# Patient Record
Sex: MALE | Race: OTHER | Employment: FULL TIME | URBAN - METROPOLITAN AREA
[De-identification: names, ages, dates, MRNs, and addresses within clinical notes are randomized per-mention and may not be internally consistent; named-entity substitution may affect disease eponyms.]

---

## 2020-08-10 ENCOUNTER — HOSPITAL ENCOUNTER (OUTPATIENT)
Dept: PHYSICAL THERAPY | Age: 35
Discharge: HOME | End: 2020-08-10
Payer: COMMERCIAL

## 2020-08-10 DIAGNOSIS — M25.511 BILATERAL SHOULDER PAIN, UNSPECIFIED CHRONICITY: ICD-10-CM

## 2020-08-10 DIAGNOSIS — M25.521 BILATERAL ELBOW JOINT PAIN: ICD-10-CM

## 2020-08-10 DIAGNOSIS — M25.522 BILATERAL ELBOW JOINT PAIN: ICD-10-CM

## 2020-08-10 DIAGNOSIS — M25.512 BILATERAL SHOULDER PAIN, UNSPECIFIED CHRONICITY: ICD-10-CM

## 2020-08-10 DIAGNOSIS — M95.8 WINGED SCAPULA OF BOTH SIDES: ICD-10-CM

## 2020-08-10 DIAGNOSIS — G71.02 MUSCULAR DYSTROPHY, FACIOSCAPULOHUMERAL (HCC): ICD-10-CM

## 2020-08-10 PROCEDURE — 97163 PT EVAL HIGH COMPLEX 45 MIN: CPT

## 2020-08-10 PROCEDURE — 97110 THERAPEUTIC EXERCISES: CPT

## 2020-08-10 NOTE — THERAPY EVALUATION
Fran Alcaraz : 1985 Primary: [place] [facility]* Secondary:  [facility] [address] [place] [address], [address] [place] [address] [phone]   [phone] Visit Count:  1 
  
OUTPATIENT PHYSICAL THERAPY:Initial Assessment 8/10/2020 Treatment Diagnosis: Pain in Left Shoulder (M25.512) Pain in Right Shoulder (M25.511) Pain in left elbow (M25.522) Pain in right elbow (M25.521) Generalized Muscle Weakness (M62.81) Precautions/Allergies: Requires an  Patient has no known allergies. MD Orders: evaluate and treat  MEDICAL/REFERRING DIAGNOSIS: 
Muscular dystrophy, facioscapulohumeral ([facility] [address] [G71.02] Winged scapula of both sides [M95.8] Bilateral elbow joint pain [M25.521, M25.522] Bilateral shoulder pain, unspecified chronicity [M25.511, M25.512] DATE OF ONSET:  with recent decline in left UE strength REFERRING PHYSICIAN: Carmine Bradley MD  
RETURN PHYSICIAN APPOINTMENT: TBD by patient INITIAL ASSESSMENT:  Mr. Michelle Nath was diagnosed with Muscular dystrophy when he was 17 yrs old that has been characterized by gradual weakening in B UE with the right more affected than left. Pt reports he has had chronic pain in B elbow and thoracic region pain since . 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 Pt does understand MD is a progressive disorder and that his prognosis is guarded for significant improvements noted in strength.  pt presents to physical therapy with decreased strength, decreased thoracic ROM, poor muscular endurance, and generalized debility due to complications associated with MD.  Patient will benefit from skilled physical therapy for manual therapeutic techniques (as appropriate), therapeutic exercises and activities, neuromuscular re-education, and comprehensive home exercises program to address current impairments and functional limitations in order to develop a HEP that pt can safely complete at home or in community gym setting. PROBLEM LIST (Impacting functional limitations): 1. Decreased Strength 2. Decreased ADL/Functional Activities 3. Increased Pain 4. Decreased Activity Tolerance 5. Increased Fatigue 6. Decreased Flexibility/Joint Mobility 7. Edema/Girth 8. Decreased Knowledge of Precautions 9. Decreased [place] with Home Exercise Program INTERVENTIONS PLANNED: (Treatment may consist of any combination of the following) 1. Balance Exercise 2. Cold 3. Cryotherapy 4. Family Education 5. Heat 6. Home Exercise Program (HEP) 7. Manual Therapy 8. Neuromuscular Re-education/Strengthening 9. Range of Motion (ROM) 10. Therapeutic Activites 11. Therapeutic Exercise/Strengthening 12. Transcutaneous Electrical Nerve Stimulation (TENS) TREATMENT PLAN: 
Effective Dates: 8/10/2020 TO 10/9/2020 (60 days). Frequency/Duration: 2 times a week for 60 Day(s) GOALS: (Goals have been discussed and agreed upon with patient.) Short-Term Goals: 30 days  Goal Met 1. Augie Barrientos will be independent with HEP to maintain functional capacity and ROM spine and UE to decrease pain. 1.  [] Date: 2. Augie Barrientos will demonstrate ability to complete 10 min of consecutive UE ergometer to improve muscular endurance and cardiovascular capacity. 2.  [] Date: 3. Augie Barrientos will improve  strength by 10 lbs in order to carry, lift, and manipulate objects needed for ADLs and IADLs. 3.  [] Date:  
    
 Long Term Goals: 60 days Goal Met 1. Augie Barrientos will demonstrate a 10 point increase on the DASH to indicate decreased perceived disability and improved participation in ADLs/IADLs. 1.  [] Date: 2. Augie Barrientos will demonstrate ability to carry 20 lbs in left/right UE will walking >= 150ft in order to carry objects in household and community. 2.  [] Date: 3. Augie Barrientos will demonstrate appropriate lifting technique from floor to waist level with 20lbs and no cues from therapist to complete work related duties. 3.  [] Date: 4. Ashkan Sheehan will report no more than 4/10 pain in B elbows with exercise. 4.  [] Date:  
 
 
OUTCOME MEASURE:  
Tool Used: Disabilities of the Arm, Shoulder and Hand (DASH) Questionnaire - Quick Version Score:  Initial: 49/55  Most Recent: X/55 (Date: -- ) Interpretation of Score: The DASH is designed to measure the activities of daily living in person's with upper extremity dysfunction or pain. Each section is scored on a 1-5 scale, 5 representing the greatest disability. The scores of each section are added together for a total score of 55. Tool Used:  Strength Score:  Initial: 65 lbs Right, Left 105 lbs Most Recent: X lbs (Date: -- ) Interpretation of Score: MDC for  strength is 14 lbs Outcome Measure Conversion Site MEDICAL NECESSITY:  
· Patient is expected to demonstrate progress in strength, range of motion, functional technique and postural control/awareness · Skilled intervention continues to be required due to deficits and impairments seen upon initial evaluation affecting patient's participation in ADLs and functional tasks. REASON FOR SERVICES/OTHER COMMENTS: 
· Patient continues to require skilled intervention due to deficits and impairments seen upon initial evaluation affecting patient's participation in ADLs and functional tasks. Total Duration: 40 minutes plus treatment PT Patient Time In/Time Out Time In: 1050 Time Out: 1140 Rehabilitation Potential For Stated Goals: Poor Regarding Edwar Gamezon therapy, I certify that the treatment plan above will be carried out by a therapist or under their direction. Thank you for this referral, Sebastian Slater PT Referring Physician Signature: Radha Valverde MD _______________________________ Date _____________ PAIN/SUBJECTIVE:  
Initial:   10/10 Post Session:  10/10 HISTORY:  
 History of Injury/Illness (Reason for Referral): 
ot reports working at a [employer] where  I have to lift 10 -15 lbs. A lot of pain after my shift of 12 hours my left arm. Minimal help with previous therapy intervention. Novemebr 2018 last therapy session. I want to bend my arm and have less pain on the left. · Aggravating factors: lifting, carrying · Relieving factors: rest, stretching Dominant Side:  
      RIGHT Pain Scale on day of evaluation: · Current: 10/10 · Best: does not rate/10 · Worst: 10/10 Prior Level of Function/Work/Activity: 
Current level of function: limited by functional capacity, and gradual loss of strength Work/hobbies: bicycle at home for short durations and works in [employer] for 12 hour shifts Other Clinical Tests:   
      Imaging: NO  
 
Previous Treatment Approaches: Outpatient therapy in 2018 Social History/Living Environment:  
Pt lives in a one level home with family Past Medical History/Comorbidities:  
Mr. Beatris Parra  has no past medical history on file. Mr. Beatris Parra  has no past surgical history on file. Ambulatory/Rehab Services H2 Model Falls Risk Assessment Risk Factors: 
     (1)  Gender [Male] Ability to Rise from Chair: 
     (0)  Ability to rise in a single movement Falls Prevention Plan: No modifications necessary Total: (5 or greater = High Risk): 1 ©2010 [facility] [address] [place] [address] States Patent #[id number]. Federal Law prohibits the replication, distribution or use without written permission from [facility] [employer] Current Medications: No current outpatient medications on file. None. Date Last Reviewed:  8/10/2020 Number of Personal Factors/Comorbidities that affect the Plan of Care: 3+: HIGH COMPLEXITY EXAMINATION:  
ROM:   
AROM/PROM Joint: Eval Date: 8/10/20  Re-Assess Date:  Re-Assess Date:   
ACTIVE ROM (standing) RIGHT LEFT RIGHT LEFT RIGHT LEFT Shoulder Flexion Shoulder Abduction Shoulder Internal Rotation (Apley's) Shoulder External Rotation (Apley's) Elbow ROM        
PASSIVE ROM (supine) Shoulder Flexion full full Shoulder Abduction full full Shoulder Internal Rotation full full Shoulder External Rotation  Strength 65 lbs 105 lbs Strength:   
Joint: Eval Date: 8/10/20  Re-Assess Date:  Re-Assess Date:   
 RIGHT LEFT RIGHT LEFT RIGHT LEFT Shoulder Flexion 3-/5 3+/5 Shoulder Abduction  (C5) 3-/5 3+/5 Shoulder Internal Rotation NT/5 NT/5 Shoulder External Rotation 2+/5 3/5 Elbow Flexion  (C6) 2/5 3+/5 Elbow Extension (C7) NT/5 NT/5 Wrist Flexion (C7) 4-/5 4-/5 Wrist Extension (C6) 4-/5 4-/5 Scapular Elevation(C2, 3, 4) 4/5 4/5 Scapular Depression 3+/5 3+/5 Scapular retraction 3-/5 3/5 Strength:  0-5 scale, 0 no muscle contraction; 1 no joint motion but contraction felt; 2 -less than full ROM in gravity eliminated; 2 full ROM in grav eliminated; 2+ full ROM in grav eliminated and caroline to withstand minimal resist; 3-less than full ROM against gravity; 3 full ROM against gravity;  3+ full ROM against gravity and able to withstand minimal resist; 4- full ROM against gravity and able to withstand less than mod resist; 4 full ROM against gravity and able to withstand mod resist; 5 full ROM against gravity and able to withstand max resist.  
 
Neurological Screen: 
   
      Sensation: NT Functional Mobility:  
    
      Overhead reach: must complete with left UE help Body Structures Involved: 1. Bones 2. Joints 3. Muscles 4. Ligaments Body Functions Affected: 1. Sensory/Pain 2. Movement Related Activities and Participation Affected: 1. General Tasks and Demands 2. Mobility 3. Self Care 4. Domestic Life 5. Interpersonal Interactions and Relationships 6. Community, Social and [place] Number of elements (examined above) that affect the Plan of Care: 4+: HIGH COMPLEXITY CLINICAL PRESENTATION:  
Presentation: Evolving clinical presentation with unstable and unpredictable characteristics: HIGH COMPLEXITY CLINICAL DECISION MAKING:  
Use of outcome tool(s) and clinical judgement create a POC that gives a: Difficult prediction of patient's progress: HIGH COMPLEXITY

## 2020-08-10 NOTE — PROGRESS NOTES
Mayito Avelar  : 1985  Payor: Nuno Peñaloza / [place]: [place] OF [address] [place] [address] Rd / Product Type: PPO /  [id number] [facility], [address]  [place], [address]  [phone]   [phone]        OUTPATIENT PHYSICAL THERAPY: Daily Treatment Note 8/10/2020 Visit Count:  1  Treatment Diagnosis: Pain in Left Shoulder (M25.512)  Pain in Right Shoulder (M25.511)  Pain in left elbow (M25.522)  Pain in right elbow (M25.521)   Generalized Muscle Weakness (M62.81)    Precautions/Allergies: Requires an   Patient has no known allergies. MD Orders: evaluate and treat  MEDICAL/REFERRING DIAGNOSIS:  Muscular dystrophy, facioscapulohumeral ([facility] [address] [G71.02]  Winged scapula of both sides [M95.8]  Bilateral elbow joint pain [M25.521, M25.522]  Bilateral shoulder pain, unspecified chronicity [M25.511, M25.512]   DATE OF ONSET:  with recent decline in left UE strength  REFERRING PHYSICIAN: Marli Pike MD   RETURN PHYSICIAN APPOINTMENT: TBD by patient           Pre-treatment Symptoms/Complaints:  See Initial Eval Dated 8/10/20 for more details. Pain: Initial:10/10 Post Session: 10/10   Medications Last Reviewed:  8/10/2020     Updated Objective Findings: See Initial Eval for more details. TREATMENT:   THERAPEUTIC EXERCISE: (10 minutes):  Exercises per grid below to improve mobility, strength and balance. Required minimal visual, verbal and manual cues to promote proper body alignment and promote proper body posture. Progressed resistance and complexity of movement as indicated. Date:  8/10/2020 Date:   Date:     Activity/Exercise Parameters Parameters Parameters   Education HEP, POC, PT goals, anatomy/pathology     Scapular retraction with ER 10 x     Open book 10 x      Quad rocking 10 x     Self thoracic mob  Chair  1 min                       THERAPEUTIC ACTIVITY: ( 0 minutes):  Activities per gid below to improve functional movement related mobility, strength and balance to improve neuro-muscular carryover to daily functional activities for improving patient's quality of life. Required visual, verbal and manual cues to promote proper body alignment and promote proper body posture/mechanics. Progressed resistance and complexity of movement as indicated. Date:  8/10/2020 Date:   Date:     Activity/Exercise Parameters Parameters Parameters                                                                               MANUAL THERAPY: (0 minutes): Joint mobilization, Soft tissue mobilization was utilized and necessary because of the patient's restricted joint motion and restricted motion of soft tissue mobility. Date  8/10/2020    Technique Used Grade  Level # Time(s) Effect while being performed                                                                 HEP Log Date 1. Quad rocking, thoracic extension mob, scapular retraction with ER 8/10/2020   2.  8/10/2020   3. 8/10/2020   4.    5.           [employer] Screening Portal  Treatment/Session Summary:    Response to Treatment: Pt demonstrated understanding of POC and initial HEP. No increase in pain or adverse reactions. Communication/Consultation:  POC, HEP, PT goals, Faxed initial evaluation to MD.   Equipment provided today: HEP Handout     Recommendations/Intent for next treatment session:   Next visit will focus on Core Stability Pain Science Education RTC strengthening weightbearing. Treatment Plan of Care Effective Dates: 8/10/2020 TO 10/9/2020 (60 days).   Frequency/Duration: 2 times a week for 60 Days             Total Treatment Billable Duration:   10  Rx plus Eval   PT Patient Time In/Time Out  Time In: 1050  Time Out: [place] Orellana    Future Appointments   Date Time Provider Marichuy Peng   8/13/2020  2:00 PM Gomez Huerta PT [facility]   8/20/2020  2:00 PM Gomez Huerta PT [facility]   8/21/2020  9:00 AM Augusta Sebastián, PT [facility]   8/27/2020  9:00 AM Augusta Sebastián, PT [facility]   8/28/2020  9:00 AM Augusta [place], PT [facility]   9/3/2020  2:00 PM Augusta [place], PT [facility]   9/4/2020  9:00 AM Augusta Sebastián, PT [facility]   9/8/2020 12:40 PM Alexa Lund MD [id number]   9/10/2020  2:00 PM Augusta [place], PT [facility]   9/11/2020  9:00 AM Augusta Sebastián, PT [facility]   9/17/2020  2:00 PM Augusta Sebastián, PT [facility]   9/24/2020  2:00 PM Augusta [place], PT [facility]   9/25/2020  9:00 AM Augusta Sebastián, PT [facility]

## 2020-08-12 NOTE — PROGRESS NOTES
I am accessing Mr. Sanam Inman chart as a part of our department's internal chart auditing process. I certify that Mr. Héctor Sanders is, or was, a patient in our department.   Thank you,  Dino Steen  8/12/2020

## 2020-08-13 ENCOUNTER — APPOINTMENT (OUTPATIENT)
Dept: PHYSICAL THERAPY | Age: 35
End: 2020-08-13
Payer: COMMERCIAL

## 2020-08-20 ENCOUNTER — APPOINTMENT (OUTPATIENT)
Dept: PHYSICAL THERAPY | Age: 35
End: 2020-08-20
Payer: COMMERCIAL

## 2020-08-21 ENCOUNTER — HOSPITAL ENCOUNTER (OUTPATIENT)
Dept: PHYSICAL THERAPY | Age: 35
Discharge: HOME | End: 2020-08-21
Payer: COMMERCIAL

## 2020-08-21 PROCEDURE — 97530 THERAPEUTIC ACTIVITIES: CPT

## 2020-08-21 PROCEDURE — 97110 THERAPEUTIC EXERCISES: CPT

## 2020-08-21 NOTE — PROGRESS NOTES
Elena Freeze  : 1985  Payor: Yoko Earing / [place]: [place] OF [address] [place] [address] Rd / Product Type: PPO /  Nathan Crumbly at [address] [facility], [address], [address] [place] [address]  [phone]   [phone]        OUTPATIENT PHYSICAL THERAPY: Daily Treatment Note 2020 Visit Count:  2  Treatment Diagnosis: Pain in Left Shoulder (M25.512)  Pain in Right Shoulder (M25.511)  Pain in left elbow (M25.522)  Pain in right elbow (M25.521)   Generalized Muscle Weakness (M62.81)    Precautions/Allergies: Requires an   Patient has no known allergies. MD Orders: evaluate and treat  MEDICAL/REFERRING DIAGNOSIS:  Muscular dystrophy, facioscapulohumeral ([facility] [address] [G71.02]  Winged scapula of both sides [M95.8]  Bilateral elbow joint pain [M25.521, M25.522]  Bilateral shoulder pain, unspecified chronicity [M25.511, M25.512]   DATE OF ONSET:  with recent decline in left UE strength  REFERRING PHYSICIAN: Sacha Licea MD   RETURN PHYSICIAN APPOINTMENT: TBD by patient           Pre-treatment Symptoms/Complaints:  Pt with no specific reported change. Interpretor present during entire session. Pain: Initial:0/10 Post Session: 0/10   Medications Last Reviewed:  2020     Updated Objective Findings: None today. TREATMENT:   THERAPEUTIC EXERCISE: (20 minutes):  Exercises per grid below to improve mobility, strength and balance. Required minimal visual, verbal and manual cues to promote proper body alignment and promote proper body posture. Progressed resistance and complexity of movement as indicated.      Date:  8/10/2020 Date:  20 Date:     Activity/Exercise Parameters Parameters Parameters   Education HEP, POC, PT goals, anatomy/pathology     Scapular retraction with ER 10 x     Open book 10 x      Quad rocking 10 x 2 x 10 reps with emphasis on forward rock    Self thoracic mob  Chair  1 min Chair  5 x 10 - 15 sec    UBE  3 min fwd/backward    Quadruped trunk roation  15 x left/right    Wall slides  2 x 10 reps  Partial lift off          THERAPEUTIC ACTIVITY: ( 25 minutes): Activities per gid below to improve functional movement related mobility, strength and balance to improve neuro-muscular carryover to daily functional activities for improving patient's quality of life. Required visual, verbal and manual cues to promote proper body alignment and promote proper body posture/mechanics. Progressed resistance and complexity of movement as indicated. Date:  8/21/2020 Date:   Date:     Activity/Exercise Parameters Parameters Parameters   Rosi Swann Deadlift 15 lb  3 x 10 reps       Lester Carry 15 lbs  4 x 150 ft       Chest Press 5 lb  2 x 10 reps       Overhead Press 5lb  2 x 10 reps       Wall push up 2 x 10 reps                                        HEP Log Date 1. Quad rocking, thoracic extension mob, scapular retraction with ER 8/10/2020   2.  8/21/2020   3. 8/21/2020   4.    5.           [employer] Portal  Treatment/Session Summary:    Response to Treatment: Pt has increased difficulty lifting overhead without trunk compensation due to progressive neurological changes associated with muscular dystrophy. Pt with decreased understanding of un-likeliness of returning bicep function on bilateral sides with goal of therapy to strengthen muscles more globally and work on conditioning overall without overexertion at detrement of preserved functional capabilities. Pt reports increased fatigue by end of session. Communication/Consultation:  None today   Equipment provided today: None today     Recommendations/Intent for next treatment session:   Next visit will focus on Core Stability Pain Science Education RTC strengthening weightbearing. Treatment Plan of Care Effective Dates: 8/21/2020 TO 10/9/2020 (60 days).   Frequency/Duration: 2 times a week for 60 Days             Total Treatment Billable Duration:   45  Rx  PT Patient Time In/Time Out  Time In: 0900  Time Out: 0945  Brittany Stephenser, PT    Future Appointments   Date Time Provider Marichuy Ginette   8/27/2020  9:00 AM [place] Eans, PT [facility] [place] [facility]   8/28/2020  9:00 AM [place] Eans, PT [facility]   9/3/2020  2:00 PM Gu[place] Eans, PT [facility]   9/4/2020  9:00 AM Gu[place] Eans, PT [facility]   9/8/2020 12:40 PM Carmine Bradley MD [id number]   9/10/2020  2:00 PM Gu[place] Eans, PT [facility]   9/11/2020  9:00 AM Gu[place] Eans, PT [facility]   9/17/2020  2:00 PM Gu[place] Eans, PT [facility]   9/24/2020  2:00 PM Gu[place] Eans, PT [facility]   9/25/2020  9:00 AM Gu[place] Eans, PT [facility]

## 2020-08-27 ENCOUNTER — APPOINTMENT (OUTPATIENT)
Dept: PHYSICAL THERAPY | Age: 35
End: 2020-08-27
Payer: COMMERCIAL

## 2020-08-28 ENCOUNTER — HOSPITAL ENCOUNTER (OUTPATIENT)
Dept: PHYSICAL THERAPY | Age: 35
Discharge: HOME | End: 2020-08-28
Payer: COMMERCIAL

## 2020-08-28 PROCEDURE — 97530 THERAPEUTIC ACTIVITIES: CPT

## 2020-08-28 PROCEDURE — 97110 THERAPEUTIC EXERCISES: CPT

## 2020-08-28 NOTE — PROGRESS NOTES
Zayra Marlene  : 1985  Payor: Edgar Baeza / Plan: [place] CROSS OF [address] [place] [address] Rd / Product Type: PPO /  [id number]. [facility], [address], [address]  [phone]   [phone]        OUTPATIENT PHYSICAL THERAPY: Daily Treatment Note 2020 Visit Count:  3  Treatment Diagnosis: Pain in Left Shoulder (M25.512)  Pain in Right Shoulder (M25.511)  Pain in left elbow (M25.522)  Pain in right elbow (M25.521)   Generalized Muscle Weakness (M62.81)    Precautions/Allergies: Requires an   Patient has no known allergies. MD Orders: evaluate and treat  MEDICAL/REFERRING DIAGNOSIS:  Muscular dystrophy, facioscapulohumeral ([facility] [address] [G71.02]  Winged scapula of both sides [M95.8]  Bilateral elbow joint pain [M25.521, M25.522]  Bilateral shoulder pain, unspecified chronicity [M25.511, M25.512]   DATE OF ONSET:  with recent decline in left UE strength  REFERRING PHYSICIAN: Olena Phillips MD   RETURN PHYSICIAN APPOINTMENT: TBD by patient           Pre-treatment Symptoms/Complaints:  Pt with no specific reported change. Interpretor present during entire session. Pain: Initial:0/10 Post Session: 0/10   Medications Last Reviewed:  2020     Updated Objective Findings: None today. TREATMENT:   THERAPEUTIC EXERCISE: (20 minutes):  Exercises per grid below to improve mobility, strength and balance. Required minimal visual, verbal and manual cues to promote proper body alignment and promote proper body posture. Progressed resistance and complexity of movement as indicated.      Date:  8/10/2020 Date:  20 Date:  20   Activity/Exercise Parameters Parameters Parameters   Education HEP, POC, PT goals, anatomy/pathology     Scapular retraction with ER 10 x     Open book 10 x      Quad rocking 10 x 2 x 10 reps with emphasis on forward rock    Self thoracic mob  Chair  1 min Chair  5 x 10 - 15 sec    UBE  3 min fwd/backward 3 min fwd, 3 min backward lvl 1.2   Quadruped trunk roation  15 x left/right    Wall slides  2 x 10 reps  Partial lift off 1:30 min  Partial lift off   Eccentric lat   Supine with dowel vince, 5 lb  3 x 10 reps   Pec Stretch   Corner  3 x 30 sec         THERAPEUTIC ACTIVITY: ( 25 minutes): Activities per gid below to improve functional movement related mobility, strength and balance to improve neuro-muscular carryover to daily functional activities for improving patient's quality of life. Required visual, verbal and manual cues to promote proper body alignment and promote proper body posture/mechanics. Progressed resistance and complexity of movement as indicated. Date:  8/21/2020 Date:  8/28/20 Date:     Activity/Exercise Parameters Parameters Parameters   Kettle Swann Deadlift 15 lb  3 x 10 reps 15lb  3 x 10 reps     Lester Carry 15 lbs  4 x 150 ft       Chest Press 5 lb  2 x 10 reps Supine  3 x 10 reps  5lb     Overhead Press 5lb  2 x 10 reps      Wall push up 2 x 10 reps 3 x 10 reps  36in surface     Goblet Squat   10lb  3 x 10 reps     Goblet Carry   5lb  3 x 150 ft                  HEP Log Date 1. Quad rocking, thoracic extension mob, scapular retraction with ER 8/10/2020   2.  8/28/2020   3. 8/28/2020   4.    5.           [facility]  Treatment/Session Summary:    Response to Treatment: Pt requires frequent rest breaks due to fatigue of muscles. Pt reports that he had no pain or soreness after last session. Therapist continues to educate pt that a significant gain in strength is not anticipated due to nature of muscular dystrophy. Communication/Consultation:  Therapist encourages pt to use NMES machine he got last year to assist with ADLs, however therapist educated pt it will not necessarily make long term gains in overall strength, but could be helpful for activating the muscle.    Equipment provided today: None today     Recommendations/Intent for next treatment session:   Next visit will focus on Core Stability Pain Science Education RTC strengthening weightbearing. Treatment Plan of Care Effective Dates: 8/10/2020 TO 10/9/2020 (60 days).   Frequency/Duration: 2 times a week for 60 Days       Total Treatment Billable Duration:   45  Rx  PT Patient Time In/Time Out  Time In: 0905  Time Out: 2001 Harry Garcia, PT    Future Appointments   Date Time Provider Marichuy Peng   9/3/2020  2:00 PM Gabriela Daniels, PT [facility]   9/4/2020  9:00 AM [facility]   9/8/2020 12:40 PM Dana Berman MD [id number]   9/10/2020  2:00 PM Gabriela Daniels, PT [facility]   9/11/2020  9:00 AM Gabriela Daniels, PT [facility]   9/17/2020  2:00 PM Gabriela Daniels, PT [facility]   9/24/2020  2:00 PM Gabriela Daniels, PT [facility]   9/25/2020  9:00 AM Gabriela Daniels, PT [facility]

## 2020-09-03 ENCOUNTER — APPOINTMENT (OUTPATIENT)
Dept: PHYSICAL THERAPY | Age: 35
End: 2020-09-03
Payer: COMMERCIAL

## 2020-09-04 ENCOUNTER — HOSPITAL ENCOUNTER (OUTPATIENT)
Dept: PHYSICAL THERAPY | Age: 35
Discharge: HOME | End: 2020-09-04
Payer: COMMERCIAL

## 2020-09-04 PROCEDURE — 97110 THERAPEUTIC EXERCISES: CPT

## 2020-09-04 PROCEDURE — 97530 THERAPEUTIC ACTIVITIES: CPT

## 2020-09-04 NOTE — PROGRESS NOTES
Meli Laser  : 1985  Payor: Jessica José / Plan: [place] OF [address] [place] [address] Rd / Product Type: PPO /  [id number] [facility] at [address] [place] [address]. [facility], Suite [facility], [address] [place] [address]  [phone]   [phone]        OUTPATIENT PHYSICAL THERAPY: Daily Treatment Note 2020 Visit Count:  4  Treatment Diagnosis: Pain in Left Shoulder (M25.512)  Pain in Right Shoulder (M25.511)  Pain in left elbow (M25.522)  Pain in right elbow (M25.521)   Generalized Muscle Weakness (M62.81)    Precautions/Allergies: Requires an   Patient has no known allergies. MD Orders: evaluate and treat  MEDICAL/REFERRING DIAGNOSIS:  Muscular dystrophy, facioscapulohumeral ([facility] [address] [G71.02]  Winged scapula of both sides [M95.8]  Bilateral elbow joint pain [M25.521, M25.522]  Bilateral shoulder pain, unspecified chronicity [M25.511, M25.512]   DATE OF ONSET:  with recent decline in left UE strength  REFERRING PHYSICIAN: Amber Kelley MD   RETURN PHYSICIAN APPOINTMENT: TBD by patient           Pre-treatment Symptoms/Complaints:  \"I am doing good today. No Pain yet\". Interpretor present during entire session. Pain: Initial:0/10 Post Session: 0/10   Medications Last Reviewed:  2020     Updated Objective Findings: None today. TREATMENT:   THERAPEUTIC EXERCISE: (15 minutes):  Exercises per grid below to improve mobility, strength and balance. Required minimal visual, verbal and manual cues to promote proper body alignment and promote proper body posture. Progressed resistance and complexity of movement as indicated.      Date:  8/10/2020 Date:  20 Date:  20 Date:  20   Activity/Exercise Parameters Parameters Parameters    Education HEP, POC, PT goals, anatomy/pathology      Scapular retraction with ER 10 x      Open book 10 x       Quad rocking 10 x 2 x 10 reps with emphasis on forward rock     Self thoracic mob  Chair  1 min Chair  5 x 10 - 15 sec     UBE  3 min fwd/backward 3 min fwd, 3 min backward lvl 1.2 3 min fwd, 3 min backward lvl 1.2   Quadruped trunk roation  15 x left/right     Wall slides  2 x 10 reps  Partial lift off 1:30 min  Partial lift off 3.5 lb  1 min x 2 reps   Eccentric lat   Supine with dowel vince, 5 lb  3 x 10 reps Supine with dowel vince, 5 lb  3 x 10 reps   Pec Stretch   Corner  3 x 30 sec          THERAPEUTIC ACTIVITY: ( 25 minutes): Activities per gid below to improve functional movement related mobility, strength and balance to improve neuro-muscular carryover to daily functional activities for improving patient's quality of life. Required visual, verbal and manual cues to promote proper body alignment and promote proper body posture/mechanics. Progressed resistance and complexity of movement as indicated. Date:  8/21/2020 Date:  8/28/20 Date:  9/4/20   Activity/Exercise Parameters Parameters Parameters   Kettle Swann Deadlift 15 lb  3 x 10 reps 15lb  3 x 10 reps 15 lb  3 x 10 reps   Lester Carry 15 lbs  4 x 150 ft   15 lb  5 x 100ft   Chest Press 5 lb  2 x 10 reps Supine  3 x 10 reps  5lb Supine  3 x 10 reps  7lb   Overhead Press 5lb  2 x 10 reps      Wall push up 2 x 10 reps 3 x 10 reps  36in surface     Goblet Squat   10lb  3 x 10 reps 10lb  3 x 10 reps   Goblet Carry   5lb  3 x 150 ft      Carry    B UE  5 lb  3 x 100ft                HEP Log Date 1. Quad rocking, thoracic extension mob, scapular retraction with ER 8/10/2020   2.  9/4/2020   3. 9/4/2020   4.    5.           [facility]  Treatment/Session Summary:    Response to Treatment: Pt given interval rest breaks and exercises that alternate between overhead, carrying, and pushing tasks to prevent excessive fatigue. Pt was able to increase chest press to 7lb indicating neuro-muscular changes.     Communication/Consultation:  None today   Equipment provided today: None today     Recommendations/Intent for next treatment session:   Next visit will focus on Core Stability Pain Science Education RTC strengthening weightbearing. Treatment Plan of Care Effective Dates: 8/10/2020 TO 10/9/2020 (60 days).   Frequency/Duration: 2 times a week for 60 Days       Total Treatment Billable Duration:   45  Rx  PT Patient Time In/Time Out  Time In: 0900  Time Out: 0940  Aurelio Dorman PT    Future Appointments   Date Time Provider Marichuy Peng   9/8/2020 12:40 PM Evert Starks MD [id number] [place]   9/11/2020  9:00 AM Marshall Aguirre, PT [facility]   9/24/2020  2:00 PM Marshall Aguirre, PT [facility]   9/25/2020  9:00 AM Marshall Aguirre, PT [facility]   9/25/2020 11:30 AM Nara Taylor MD [facility]

## 2020-09-10 ENCOUNTER — APPOINTMENT (OUTPATIENT)
Dept: PHYSICAL THERAPY | Age: 35
End: 2020-09-10
Payer: COMMERCIAL

## 2020-09-11 ENCOUNTER — APPOINTMENT (OUTPATIENT)
Dept: PHYSICAL THERAPY | Age: 35
End: 2020-09-11
Payer: COMMERCIAL

## 2020-09-17 ENCOUNTER — APPOINTMENT (OUTPATIENT)
Dept: PHYSICAL THERAPY | Age: 35
End: 2020-09-17
Payer: COMMERCIAL

## 2020-11-05 PROBLEM — G71.02 FASCIOSCAPULOHUMERAL MUSCULAR DYSTROPHY (HCC): Status: ACTIVE | Noted: 2020-11-05

## 2020-11-05 PROBLEM — G56.03 CARPAL TUNNEL SYNDROME ON BOTH SIDES: Status: ACTIVE | Noted: 2020-11-05

## 2020-11-05 PROBLEM — R29.898 WEAKNESS OF BOTH ARMS: Status: ACTIVE | Noted: 2020-11-05
